# Patient Record
Sex: FEMALE | Race: WHITE | NOT HISPANIC OR LATINO | Employment: FULL TIME | URBAN - METROPOLITAN AREA
[De-identification: names, ages, dates, MRNs, and addresses within clinical notes are randomized per-mention and may not be internally consistent; named-entity substitution may affect disease eponyms.]

---

## 2023-05-12 ENCOUNTER — ANNUAL EXAM (OUTPATIENT)
Dept: OBGYN CLINIC | Facility: CLINIC | Age: 53
End: 2023-05-12

## 2023-05-12 VITALS
HEIGHT: 65 IN | WEIGHT: 256.6 LBS | DIASTOLIC BLOOD PRESSURE: 82 MMHG | BODY MASS INDEX: 42.75 KG/M2 | SYSTOLIC BLOOD PRESSURE: 120 MMHG

## 2023-05-12 DIAGNOSIS — Z12.31 ENCOUNTER FOR SCREENING MAMMOGRAM FOR MALIGNANT NEOPLASM OF BREAST: ICD-10-CM

## 2023-05-12 DIAGNOSIS — Z01.419 ENCNTR FOR GYN EXAM (GENERAL) (ROUTINE) W/O ABN FINDINGS: ICD-10-CM

## 2023-05-12 NOTE — PROGRESS NOTES
Candelaria Rod   1970    CC:  Yearly exam - last annual exam 8/2020    S:  45 y o  female here for yearly exam  She is postmenopausal (since 2021) and has had no vaginal bleeding  She denies vaginal discharge, itching, odor or dryness  Significant improvement in her hot flashes  Does have generalized joint aches and pains  Sexual activity: She is sexually active without pain, bleeding  Last Pap: 6/13/18 - NILM, Neg HPV  Last Mammo: 7/13/22 - BIrad 1  Last Colonoscopy: 6/25/21 - 10yr recall     We reviewed [facility] guidelines for Pap testing  Family hx of breast cancer: M Aunt  Family hx of ovarian cancer: no  Family hx of colon cancer: cousin, P Aunt      Current Outpatient Medications:   •  albuterol (PROVENTIL HFA,VENTOLIN HFA) 90 mcg/act inhaler, Inhale 2 puffs every 6 (six) hours as needed for wheezing, Disp: , Rfl:   •  Biotin 1 MG CAPS, Take by mouth, Disp: , Rfl:   •  levocetirizine (XYZAL) 5 MG tablet, Take 5 mg by mouth daily  , Disp: , Rfl:   •  meclizine (ANTIVERT) 12 5 MG tablet, TAKE 1 2 TABLETS BY MOUTH 1  3 TIMES PER DAY AS NEEDED IF NOT DROWSY, Disp: , Rfl:   •  montelukast (SINGULAIR) 10 mg tablet, Take 10 mg by mouth daily at bedtime  , Disp: , Rfl:   •  multivitamin (THERAGRAN) TABS, Take 1 tablet by mouth daily, Disp: , Rfl:   •  omeprazole (PriLOSEC) 20 mg delayed release capsule, , Disp: , Rfl:   Patient Active Problem List   Diagnosis   • Encounter for screening mammogram for malignant neoplasm of breast   • Screening for human papillomavirus (HPV)   • Encounter for gynecological examination without abnormal finding   • Encounter for gynecological examination (general) (routine) without abnormal findings   • Screening for malignant neoplasm of breast   • Family history of malignant neoplasm of colon   • Bleeding hemorrhoids     Family History   Problem Relation Age of Onset   • Cervical cancer Mother 32   • Diabetes Mother         MELLITUS   • Hypertension Mother    • No "Known Problems Maternal Grandmother    • Alzheimer's disease Maternal Grandfather    • Diabetes Paternal Grandmother         MELLITUS   • Hypertension Father    • No Known Problems Paternal Grandfather    • Breast cancer Maternal Aunt 52   • Colon cancer Paternal Aunt 79   • Colon cancer Cousin 40   • No Known Problems Maternal Aunt    • No Known Problems Maternal Aunt    • No Known Problems Paternal Aunt      Past Medical History:   Diagnosis Date   • Asthma     exercise, allergy induced   • Fibroid     Had it removed  • Kidney stone    • Other specified health status     HX OF MIGRAINE AS PER ALLSCRIPTS   • PONV (postoperative nausea and vomiting)    • Rotator cuff tear     right   • Varicella         Review of Systems   Respiratory: Negative  Cardiovascular: Negative  Gastrointestinal: Negative for constipation and diarrhea  Genitourinary: Negative for difficulty urinating, pelvic pain, vaginal bleeding, vaginal discharge, itching or odor  O:  Blood pressure 120/82, height 5' 5 16\" (1 655 m), weight 116 kg (256 lb 9 6 oz), last menstrual period 02/17/2021, not currently breastfeeding  Patient appears well and is not in distress  Breasts are symmetrical without mass, tenderness, nipple discharge, skin changes or adenopathy  Abdomen is soft and nontender without masses  External genitals are normal without lesions or rashes  Urethral meatus and urethra are normal  Bladder is normal to palpation  Vagina is normal without discharge or bleeding, generalized atrophic changes present   Cervix is normal without discharge or lesion  Uterus is normal, mobile, nontender without palpable mass  Adnexa are normal, nontender, without palpable mass  A:  Yearly exam      P:   Pap & HPV today  Mammo ordered   Colon Cancer Screening up to date      Reviewed considerations of menopause, to call with any postmenopausal bleeding or other concerns  RTO one year for yearly exam or sooner as needed        "

## 2023-05-16 LAB
HPV HR 12 DNA CVX QL NAA+PROBE: NEGATIVE
HPV16 DNA CVX QL NAA+PROBE: NEGATIVE
HPV18 DNA CVX QL NAA+PROBE: NEGATIVE

## 2023-05-18 LAB
LAB AP GYN PRIMARY INTERPRETATION: NORMAL
Lab: NORMAL

## 2024-02-05 ENCOUNTER — HOSPITAL ENCOUNTER (OUTPATIENT)
Dept: RADIOLOGY | Age: 54
Discharge: HOME | End: 2024-02-05
Payer: COMMERCIAL

## 2024-02-05 VITALS — BODY MASS INDEX: 42.65 KG/M2 | HEIGHT: 65 IN | WEIGHT: 256 LBS

## 2024-02-05 DIAGNOSIS — Z12.31 ENCOUNTER FOR SCREENING MAMMOGRAM FOR MALIGNANT NEOPLASM OF BREAST: ICD-10-CM

## 2024-02-05 PROCEDURE — 77063 BREAST TOMOSYNTHESIS BI: CPT

## 2024-02-05 PROCEDURE — 77067 SCR MAMMO BI INCL CAD: CPT

## 2024-02-21 PROBLEM — Z01.419 ENCOUNTER FOR GYNECOLOGICAL EXAMINATION WITHOUT ABNORMAL FINDING: Status: RESOLVED | Noted: 2018-06-13 | Resolved: 2024-02-21

## 2024-02-21 PROBLEM — Z01.419 ENCOUNTER FOR GYNECOLOGICAL EXAMINATION (GENERAL) (ROUTINE) WITHOUT ABNORMAL FINDINGS: Status: RESOLVED | Noted: 2019-08-06 | Resolved: 2024-02-21

## 2024-02-21 PROBLEM — Z11.51 SCREENING FOR HUMAN PAPILLOMAVIRUS (HPV): Status: RESOLVED | Noted: 2018-06-13 | Resolved: 2024-02-21

## 2024-02-21 PROBLEM — Z12.39 SCREENING FOR MALIGNANT NEOPLASM OF BREAST: Status: RESOLVED | Noted: 2019-08-06 | Resolved: 2024-02-21

## 2024-11-22 ENCOUNTER — ANNUAL EXAM (OUTPATIENT)
Dept: OBGYN CLINIC | Facility: CLINIC | Age: 54
End: 2024-11-22
Payer: COMMERCIAL

## 2024-11-22 VITALS
WEIGHT: 252.2 LBS | HEIGHT: 65 IN | DIASTOLIC BLOOD PRESSURE: 90 MMHG | SYSTOLIC BLOOD PRESSURE: 140 MMHG | BODY MASS INDEX: 42.02 KG/M2

## 2024-11-22 DIAGNOSIS — Z01.419 WELL WOMAN EXAM WITH ROUTINE GYNECOLOGICAL EXAM: Primary | ICD-10-CM

## 2024-11-22 PROCEDURE — S0612 ANNUAL GYNECOLOGICAL EXAMINA: HCPCS | Performed by: PHYSICIAN ASSISTANT

## 2024-11-22 RX ORDER — SEMAGLUTIDE 0.25 MG/.5ML
INJECTION, SOLUTION SUBCUTANEOUS
COMMUNITY
Start: 2024-11-18

## 2024-11-22 NOTE — PROGRESS NOTES
"Assessment   54 y.o.  presenting for annual exam.     Plan   Diagnoses and all orders for this visit:    Well woman exam with routine gynecological exam    Other orders  -     Wegovy 0.25 MG/0.5ML        Pap up to date  Mammo scheduled   Colonoscopy up to date   Pelvic pain- reports occasional \"twinges\" of pain. Pt unable to quantify how long and how often pain occurs. No pain presently. Struggles with constipation- unsure if pain is related to this. Advised to track if constipation is an identifiable cause. If no identifiable trigger can consider TVUS    Perineal hygiene reviewed. Weight bearing exercises minium of 150 mins/weekly advised. Kegel exercises recommended.   SBE encouraged, A yearly mammogram is recommended for breast cancer screening starting at age 40. ASCCP guidelines reviewed. Calcium/ Vit D dietary requirements discussed.   Advised to call with any issues, all concerns & questions addressed.   See provided information in your after visit summary     F/U Annually and PRN    Results will be released to [email or web address], if abnormal will call or message to review and discuss treatment plan.     __________________________________________________________________    Subjective     Obduliabatsheva Gaston is a 54 y.o.  presenting for annual exam.    She reports an occasional twinge of pain in the lower pelvis from time to time.  She is unable to quantify how long this has been going on or how often it occurs.  Believes this only occurs about once a month.  Pain is brief and fleeting and only mild.  She does struggle with chronic constipation.  She has not tracked pain to see if related to this.    SCREENING  Last Pap: 2023 neg/neg  Last Mammo: 2024 birads 1  Last Colonoscopy: 2021 10 year recall      GYN  , no VB     Sexually active: Yes - single partner - male    Hx Abnormal pap: denies  We reviewed ASCCP guidelines for Pap testing today.    Denies vaginal discharge, itching, odor, " dyspareunia, pelvic pain and vulvar/vaginal symptoms      OB           Complaints: denies   Denies urgency, frequency, hematuria, leakage / change in stream, difficulty urinating.       BREAST  Complaints: denies   Denies: breast lump, breast tenderness, nipple discharge, skin color change, and skin lesion(s)  Personal hx: none      Pertinent Family Hx:   Family hx of breast cancer: maternal aunt   Family hx of ovarian cancer: no  Family hx of colon cancer: paternal aunt, paternal cousin  Family hx of cervical cancer: mom      GENERAL  PMH reviewed/updated and is as below.   Patient does follow with a PCP.    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    Kidney stone     Other specified health status     HX OF MIGRAINE AS PER ALLSCRIPTS    PONV (postoperative nausea and vomiting)     Rotator cuff tear     right    Varicella        Past Surgical History:   Procedure Laterality Date     SECTION      x2    MYOMECTOMY      UTERINE    [place] SURGICAL ARTHROSCOPY SHOULDER LMTD DBRDMT  Right 2017    Procedure: ARTHROSCOPY SHOULDER ROTATOR CUFF DEBRIDEMENT, LYSIS OF ADHESIONS SUBACROMIAL SPACE;  Surgeon: Feliciano Wang MD;  [facility];  Service: Orthopedics    [place] SURGICAL ARTHROSCOPY SHOULDER W/ROTATOR CUFF RPR Right 2016    Procedure: SHOULDER ARTHROSCOPIC ROTATOR CUFF REPAIR ;  Surgeon: Feliciano Wang MD;  [facility];  Service: Orthopedics    TOOTH EXTRACTION           Current Outpatient Medications:     albuterol (PROVENTIL HFA,VENTOLIN HFA) 90 mcg/act inhaler, Inhale 2 puffs every 6 (six) hours as needed for wheezing, Disp: , Rfl:     Biotin 1 MG CAPS, Take by mouth, Disp: , Rfl:     levocetirizine (XYZAL) 5 MG tablet, Take 5 mg by mouth daily., Disp: , Rfl:     meclizine (ANTIVERT) 12.5 MG tablet,  TAKE 1 2 TABLETS BY MOUTH 1  3 TIMES PER DAY AS NEEDED IF NOT DROWSY, Disp: , Rfl:     montelukast (SINGULAIR) 10 mg tablet, Take 10 mg by mouth daily at bedtime., Disp: , Rfl:     multivitamin (THERAGRAN) TABS, Take 1 tablet by mouth daily, Disp: , Rfl:     omeprazole (PriLOSEC) 20 mg delayed release capsule, , Disp: , Rfl:     Wegovy 0.25 MG/0.5ML, , Disp: , Rfl:     No Known Allergies    Social History     Socioeconomic History    Marital status: /Civil Union     Spouse name: Not on file    Number of children: Not on file    Years of education: Not on file    Highest education level: Not on file   Occupational History    Not on file   Tobacco Use    Smoking status: Never    Smokeless tobacco: Never   Vaping Use    Vaping status: Never Used   Substance and Sexual Activity    Alcohol use: Yes     Comment: Only every once in awhile.    Drug use: Never    Sexual activity: Yes     Partners: Male     Birth control/protection: None   Other Topics Concern    Not on file   Social History Narrative    CAFFEINE USE    DAILY COFFEE CONSUMPTION (1 CUPS/DAY)     Social Drivers of Health     Financial Resource Strain: Not on file   Food Insecurity: Not on file   Transportation Needs: Not on file   Physical Activity: Not on file   Stress: Not on file   Social Connections: Unknown (6/18/2024)    Received from [facility]     How often do you feel lonely or isolated from those around you? (Adult - for ages 18 years and over): Not on file   Intimate Partner Violence: Not on file   Housing Stability: Not on file       Review of Systems     ROS:  Constitutional: Negative for fatigue and unexpected weight change.   Respiratory: Negative for cough and shortness of breath.    Cardiovascular: Negative for chest pain and palpitations.   Gastrointestinal: Negative for abdominal pain and change in bowel habits  Breasts:  Negative, other than as noted above.   Genitourinary: Negative, other than as noted above.  "  Psychiatric: Negative for mood difficulties.      Objective      /90 (BP Location: Left arm, Patient Position: Sitting, Cuff Size: Large)   Ht 5' 5\" (1.651 m)   Wt 114 kg (252 lb 3.2 oz)   LMP 02/17/2021   BMI 41.97 kg/m²     Physical Examination:    Patient appears well and is not in distress  Neck is supple without masses, no cervical or supraclavicular lymphadenopathy  Cardiovascular: regular rate and rhythm; no murmurs  Lungs: clear to auscultation bilaterally; no wheezes  Breasts are symmetrical without mass, tenderness, nipple discharge, skin changes or adenopathy.   Abdomen is soft and nontender without masses.   External genitals are normal without lesions or rashes.  Urethral meatus and urethra are normal  Bladder is normal to palpation  Vagina is normal without discharge or bleeding.   Cervix is normal without discharge or lesion.   Uterus is normal, mobile, nontender without palpable mass.  Adnexa are normal, nontender, without palpable mass.                 "

## 2025-02-18 ENCOUNTER — HOSPITAL ENCOUNTER (OUTPATIENT)
Dept: RADIOLOGY | Age: 55
Discharge: HOME | End: 2025-02-18
Payer: COMMERCIAL

## 2025-02-18 VITALS — HEIGHT: 65 IN | BODY MASS INDEX: 41.65 KG/M2 | WEIGHT: 250 LBS

## 2025-02-18 DIAGNOSIS — Z12.31 ENCOUNTER FOR SCREENING MAMMOGRAM FOR MALIGNANT NEOPLASM OF BREAST: ICD-10-CM

## 2025-02-18 PROCEDURE — 77063 BREAST TOMOSYNTHESIS BI: CPT

## 2025-02-18 PROCEDURE — 77067 SCR MAMMO BI INCL CAD: CPT

## 2025-02-24 ENCOUNTER — HOSPITAL ENCOUNTER (EMERGENCY)
Facility: HOSPITAL | Age: 55
Discharge: HOME | End: 2025-02-25
Attending: EMERGENCY MEDICINE | Admitting: EMERGENCY MEDICINE
Payer: COMMERCIAL

## 2025-02-24 VITALS
HEART RATE: 96 BPM | SYSTOLIC BLOOD PRESSURE: 196 MMHG | RESPIRATION RATE: 18 BRPM | OXYGEN SATURATION: 95 % | TEMPERATURE: 98.1 F | DIASTOLIC BLOOD PRESSURE: 101 MMHG

## 2025-02-24 DIAGNOSIS — N39.0 UTI (URINARY TRACT INFECTION): Primary | ICD-10-CM

## 2025-02-24 PROCEDURE — 99283 EMERGENCY DEPT VISIT LOW MDM: CPT

## 2025-02-24 PROCEDURE — 87077 CULTURE AEROBIC IDENTIFY: CPT

## 2025-02-24 PROCEDURE — 87086 URINE CULTURE/COLONY COUNT: CPT

## 2025-02-24 PROCEDURE — 87186 SC STD MICRODIL/AGAR DIL: CPT

## 2025-02-24 PROCEDURE — 81001 URINALYSIS AUTO W/SCOPE: CPT

## 2025-02-25 LAB
BACTERIA UR QL AUTO: ABNORMAL /HPF
BILIRUB UR QL STRIP: NEGATIVE
CLARITY UR: ABNORMAL
COLOR UR: ABNORMAL
GLUCOSE UR STRIP-MCNC: ABNORMAL MG/DL
HGB UR QL STRIP.AUTO: ABNORMAL
KETONES UR STRIP-MCNC: NEGATIVE MG/DL
LEUKOCYTE ESTERASE UR QL STRIP: ABNORMAL
NITRITE UR QL STRIP: NEGATIVE
NON-SQ EPI CELLS URNS QL MICRO: ABNORMAL /HPF
PH UR STRIP.AUTO: 6.5 [PH]
PROT UR STRIP-MCNC: ABNORMAL MG/DL
RBC #/AREA URNS AUTO: ABNORMAL /HPF
SP GR UR STRIP.AUTO: 1.03 (ref 1–1.03)
UROBILINOGEN UR STRIP-ACNC: <2 MG/DL
WBC #/AREA URNS AUTO: ABNORMAL /HPF

## 2025-02-25 PROCEDURE — 99284 EMERGENCY DEPT VISIT MOD MDM: CPT | Performed by: EMERGENCY MEDICINE

## 2025-02-25 RX ORDER — CEPHALEXIN 500 MG/1
500 CAPSULE ORAL EVERY 6 HOURS SCHEDULED
Qty: 28 CAPSULE | Refills: 0 | Status: SHIPPED | OUTPATIENT
Start: 2025-02-25 | End: 2025-03-04

## 2025-02-25 NOTE — DISCHARGE INSTRUCTIONS
You are seen in the emergency department for urinary symptoms.  You likely have a urinary tract infection.  Prescription for an antibiotic to be taken for 4 times a day for 7 days has been sent to your pharmacy.  Please take it for the full duration.  Its important to follow-up with your OB/GYN to discuss potential vaginal bleeding.  Please follow-up with your primary care doctor.  Return to the emergency department if you develop abdominal pain, back pain, fever, or any new or concerning symptoms.

## 2025-02-25 NOTE — ED ATTENDING ATTESTATION
2/24/2025  I, Serina Lyon MD, saw and evaluated the patient. I have discussed the patient with the resident/non-physician practitioner and agree with the resident's/non-physician practitioner's findings, Plan of Care, and MDM as documented in the resident's/non-physician practitioner's note, except where noted. All available labs and Radiology studies were reviewed.  I was present for key portions of any procedure(s) performed by the resident/non-physician practitioner and I was immediately available to provide assistance.       At this point I agree with the current assessment done in the Emergency Department.  I have conducted an independent evaluation of this patient a history and physical is as follows:    ED Course         Critical Care Time  Procedures    53 yo female with hx of kidney stone, here for dysuria, hematuria, increased frequency started today. Pt noted clots in urine.  No blood thinners.  No fever, no abdominal pain, no back pain. Pt with hx of uti.  Vss, afebrile, lungs cta, rrr, abdomen soft nontender.  No cva tenderness. Urine.

## 2025-02-26 ENCOUNTER — RESULTS FOLLOW-UP (OUTPATIENT)
Dept: EMERGENCY DEPT | Facility: HOSPITAL | Age: 55
End: 2025-02-26

## 2025-02-27 LAB — BACTERIA UR CULT: ABNORMAL

## 2025-02-28 NOTE — ED PROVIDER NOTES
"Time reflects when diagnosis was documented in both MDM as applicable and the Disposition within this note       Time User Action Codes Description Comment    2/25/2025 12:41 AM Wilder, Sara Add [N39.0] UTI (urinary tract infection)           ED Disposition       ED Disposition   Discharge    Condition   Stable    Date/Time   Tue Feb 25, 2025 12:41 AM    Comment   Obdulia Gaston discharge to home/self care.                   Assessment & Plan       Medical Decision Making  Patient is a 54 y.o. female with PMH of kidney stone who presents to the ED with dysuria and hematuria.    Vital signs hypertensive on arrival but otherwise unremarkable. On exam no belly tenderness or cva tenderness.    History and physical exam most consistent with UTI. However, differential diagnosis included but not limited to vaginal bleeding, kidney stones     Plan: UA    View ED course above for further discussion on patient workup.         All labs reviewed and utilized in the medical decision making process  All radiology studies independently viewed by me and interpreted by the radiologist.  I reviewed all testing with the patient.     UA consistent with infection. Will prescribe outpatient antibiotics. Encouraged PCP and obgyn follow up. Discussed return precautions. Patient agrees and understands plan. All questions answered.      Disposition: stable discahrge    Portions of the record may have been created with voice recognition software. Occasional wrong word or \"sound a like\" substitutions may have occurred due to the inherent limitations of voice recognition software. Read the chart carefully and recognize, using context, where substitutions have occurred.      Amount and/or Complexity of Data Reviewed  Labs: ordered.    Risk  Prescription drug management.             Medications - No data to display    ED Risk Strat Scores                                                History of Present Illness       Chief Complaint "   Patient presents with    Possible UTI     Pt started w/ increased urinary frequency & burning w/ urination tonight & states she's having bloody urine w/ clots. Unsure if blood is coming from urethra or vagina        Past Medical History:   Diagnosis Date    Asthma     exercise, allergy induced    Fibroid     Had it removed.    Kidney stone     Other specified health status     HX OF MIGRAINE AS PER ALLSCRIPTS    PONV (postoperative nausea and vomiting)     Rotator cuff tear     right    Varicella       Past Surgical History:   Procedure Laterality Date     SECTION      x2    MYOMECTOMY      UTERINE    [place] SURGICAL ARTHROSCOPY SHOULDER LMTD DBRDMT / Right 2017    Procedure: ARTHROSCOPY SHOULDER ROTATOR CUFF DEBRIDEMENT, LYSIS OF ADHESIONS SUBACROMIAL SPACE;  Surgeon: Feliciano Wang MD;  [facility];  Service: Orthopedics    [place] SURGICAL ARTHROSCOPY SHOULDER W/ROTATOR CUFF RPR Right 2016    Procedure: SHOULDER ARTHROSCOPIC ROTATOR CUFF REPAIR ;  Surgeon: Feliciano Wang MD;  [facility];  Service: Orthopedics    TOOTH EXTRACTION        Family History   Problem Relation Age of Onset    Cervical cancer Mother 31    Diabetes Mother         MELLITUS    Hypertension Mother     Hypertension Father     No Known Problems Maternal Grandmother     Alzheimer's disease Maternal Grandfather     Diabetes Paternal Grandmother         MELLITUS    No Known Problems Paternal Grandfather     Breast cancer Maternal Aunt 49    No Known Problems Maternal Aunt     No Known Problems Maternal Aunt     Colon cancer Paternal Aunt 67    No Known Problems Paternal Aunt     Colon cancer Cousin 44    Ovarian cancer Neg Hx     Uterine cancer Neg Hx       Social History     Tobacco Use    Smoking status: Never    Smokeless tobacco: Never   Vaping Use    Vaping status: Never Used   Substance Use Topics    Alcohol use: Yes     Comment: Only every once in awhile.    Drug use: Never      E-Cigarette/Vaping    E-Cigarette  Use Never User       E-Cigarette/Vaping Substances    Nicotine No     THC No     CBD No     Flavoring No     Other No     Unknown No       I have reviewed and agree with the history as documented.     54F with PMH of prior kidney stones presents for dysuria and hematuria that started about 8 hours ago. Patient noted blood clots in her urine as well. This does not feel like her prior kidney stone. She feels urgency and suprapubic fullness. Denies fever, back pain, abdominal pain, vaginal discharge, diarrhea          Review of Systems   Constitutional:  Negative for chills and fever.   HENT:  Negative for ear pain and sore throat.    Eyes:  Negative for pain and visual disturbance.   Respiratory:  Negative for cough and shortness of breath.    Cardiovascular:  Negative for chest pain and palpitations.   Gastrointestinal:  Negative for abdominal pain, diarrhea, nausea and vomiting.   Genitourinary:  Positive for dysuria, hematuria and urgency. Negative for flank pain.   Musculoskeletal:  Negative for arthralgias and back pain.   Skin:  Negative for color change and rash.   Neurological:  Negative for seizures and syncope.   All other systems reviewed and are negative.          Objective       ED Triage Vitals   Temperature Pulse Blood Pressure Respirations SpO2 Patient Position - Orthostatic VS   02/24/25 2306 02/24/25 2306 02/24/25 2307 02/24/25 2306 02/24/25 2306 02/24/25 2307   98.1 °F (36.7 °C) 96 (!) 196/101 18 95 % Lying      Temp Source Heart Rate Source BP Location FiO2 (%) Pain Score    02/24/25 2306 02/24/25 2306 02/24/25 2307 -- --    Temporal Monitor Right arm        Vitals      Date and Time Temp Pulse SpO2 Resp BP Pain Score FACES Pain Rating User   02/24/25 2307 -- -- -- -- 196/101 -- -- PT   02/24/25 2306 98.1 °F (36.7 °C) 96 95 % 18 -- -- -- PT            Physical Exam  Vitals and nursing note reviewed.   Constitutional:       General: She is not in acute distress.     Appearance: She is  well-developed.   HENT:      Head: Normocephalic and atraumatic.   Eyes:      Conjunctiva/sclera: Conjunctivae normal.   Cardiovascular:      Rate and Rhythm: Normal rate and regular rhythm.      Heart sounds: No murmur heard.  Pulmonary:      Effort: Pulmonary effort is normal. No respiratory distress.      Breath sounds: Normal breath sounds.   Abdominal:      Palpations: Abdomen is soft.      Tenderness: There is no abdominal tenderness. There is no right CVA tenderness, left CVA tenderness, guarding or rebound.   Musculoskeletal:         General: No swelling.      Cervical back: Neck supple.   Skin:     General: Skin is warm and dry.      Capillary Refill: Capillary refill takes less than 2 seconds.   Neurological:      Mental Status: She is alert.   Psychiatric:         Mood and Affect: Mood normal.         Results Reviewed       Procedure Component Value Units Date/Time    Urine culture [[id number]]  (Abnormal)  (Susceptibility) Collected: 02/24/25 2359    Lab Status: Final result Specimen: Urine, Clean Catch Updated: 02/27/25 0713     Urine Culture >100,000 cfu/ml Escherichia coli    Susceptibility       Escherichia coli (1)       Antibiotic Interpretation Microscan   Method Status    ZID Performed  Yes  PASTORA Final    Ampicillin ($$) Susceptible <=8.00 ug/ml PASTORA Final    Aztreonam ($$$)  Susceptible <=4 ug/ml PASTORA Final    Cefazolin ($) Susceptible <=2.00 ug/ml PASTORA Final    Ciprofloxacin ($)  Susceptible <=0.25 ug/ml PASTORA Final    Gentamicin ($$) Susceptible <=2 ug/ml PASTORA Final    Levofloxacin ($) Susceptible <=0.50 ug/ml PASTORA Final    Nitrofurantoin Susceptible <=32 ug/ml PASTORA Final    Tetracycline Susceptible <=4 ug/ml PASTORA Final    Trimethoprim + Sulfamethoxazole ($$$) Susceptible <=0.5/9.5 ug/ml PASTORA Final                       Urine Microscopic [[id number]]  (Abnormal) Collected: 02/24/25 2359    Lab Status: Final result Specimen: Urine, Clean Catch Updated: 02/25/25 0029     RBC, UA Innumerable /hpf      WBC, UA  Innumerable /hpf      Epithelial Cells None Seen /hpf      Bacteria, UA None Seen /hpf     UA w Reflex to Microscopic w Reflex to Culture [[id number]]  (Abnormal) Collected: 02/24/25 6207    Lab Status: Final result Specimen: Urine, Clean Catch Updated: 02/25/25 0019     Color, UA Dark Red     Clarity, UA Extra Turbid     Specific Gravity, UA 1.029     pH, UA 6.5     Leukocytes, UA Moderate     Nitrite, UA Negative     Protein,  (3+) mg/dl      Glucose, UA Trace mg/dl      Ketones, UA Negative mg/dl      Urobilinogen, UA <2.0 mg/dl      Bilirubin, UA Negative     Occult Blood, UA Large            No orders to display       Procedures    ED Medication and Procedure Management   Prior to Admission Medications   Prescriptions Last Dose Informant Patient Reported? Taking?   Biotin 1 MG CAPS  Self Yes No   Sig: Take by mouth   Wegovy 0.25 MG/0.5ML   Yes No   albuterol (PROVENTIL HFA,VENTOLIN HFA) 90 mcg/act inhaler  Self Yes No   Sig: Inhale 2 puffs every 6 (six) hours as needed for wheezing   levocetirizine (XYZAL) 5 MG tablet  Self Yes No   Sig: Take 5 mg by mouth daily.   meclizine (ANTIVERT) 12.5 MG tablet  Self Yes No   Sig: TAKE 1 2 TABLETS BY MOUTH 1  3 TIMES PER DAY AS NEEDED IF NOT DROWSY   montelukast (SINGULAIR) 10 mg tablet  Self Yes No   Sig: Take 10 mg by mouth daily at bedtime.   multivitamin (THERAGRAN) TABS  Self Yes No   Sig: Take 1 tablet by mouth daily   omeprazole (PriLOSEC) 20 mg delayed release capsule   Yes No      Facility-Administered Medications: None     Discharge Medication List as of 2/25/2025 12:44 AM        START taking these medications    Details   cephalexin (KEFLEX) 500 mg capsule Take 1 capsule (500 mg total) by mouth every 6 (six) hours for 7 days, Starting Tue 2/25/2025, Until Tue 3/4/2025, Normal           CONTINUE these medications which have NOT CHANGED    Details   albuterol (PROVENTIL HFA,VENTOLIN HFA) 90 mcg/act inhaler Inhale 2 puffs every 6 (six) hours as needed for  wheezing, Historical Med      Biotin 1 MG CAPS Take by mouth, Historical Med      levocetirizine (XYZAL) 5 MG tablet Take 5 mg by mouth daily., Historical Med      meclizine (ANTIVERT) 12.5 MG tablet TAKE 1 2 TABLETS BY MOUTH 1  3 TIMES PER DAY AS NEEDED IF NOT DROWSY, Historical Med      montelukast (SINGULAIR) 10 mg tablet Take 10 mg by mouth daily at bedtime., Historical Med      multivitamin (THERAGRAN) TABS Take 1 tablet by mouth daily, Historical Med      omeprazole (PriLOSEC) 20 mg delayed release capsule Starting Wed 9/7/2022, Historical Med      Wegovy 0.25 MG/0.5ML Historical Med           No discharge procedures on file.  ED SEPSIS DOCUMENTATION   Time reflects when diagnosis was documented in both MDM as applicable and the Disposition within this note       Time User Action Codes Description Comment    2/25/2025 12:41 AM Shelly Wilder Add [N39.0] UTI (urinary tract infection)                  Shelly Wilder DO  02/27/25 2047

## 2025-04-05 ENCOUNTER — APPOINTMENT (OUTPATIENT)
Dept: LAB | Facility: CLINIC | Age: 55
End: 2025-04-05
Payer: COMMERCIAL

## 2025-04-05 DIAGNOSIS — R03.0 ELEVATED BLOOD PRESSURE READING WITHOUT DIAGNOSIS OF HYPERTENSION: ICD-10-CM

## 2025-04-05 DIAGNOSIS — R20.2 PARESTHESIA: ICD-10-CM

## 2025-04-05 DIAGNOSIS — M79.10 MYALGIA: ICD-10-CM

## 2025-04-05 DIAGNOSIS — R76.8 FALSE POSITIVE SEROLOGICAL TEST FOR SYPHILIS: ICD-10-CM

## 2025-04-05 DIAGNOSIS — M79.675 PAIN IN TOE OF LEFT FOOT: ICD-10-CM

## 2025-04-05 LAB
25(OH)D3 SERPL-MCNC: 38.3 NG/ML (ref 30–100)
ALBUMIN SERPL BCG-MCNC: 4.1 G/DL (ref 3.5–5)
ALP SERPL-CCNC: 112 U/L (ref 34–104)
ALT SERPL W P-5'-P-CCNC: 22 U/L (ref 7–52)
ANION GAP SERPL CALCULATED.3IONS-SCNC: 10 MMOL/L (ref 4–13)
AST SERPL W P-5'-P-CCNC: 19 U/L (ref 13–39)
BACTERIA UR QL AUTO: ABNORMAL /HPF
BASOPHILS # BLD AUTO: 0.03 THOUSANDS/ÂΜL (ref 0–0.1)
BASOPHILS NFR BLD AUTO: 0 % (ref 0–1)
BILIRUB SERPL-MCNC: 0.38 MG/DL (ref 0.2–1)
BILIRUB UR QL STRIP: NEGATIVE
BUN SERPL-MCNC: 18 MG/DL (ref 5–25)
CALCIUM SERPL-MCNC: 9.1 MG/DL (ref 8.4–10.2)
CHLORIDE SERPL-SCNC: 103 MMOL/L (ref 96–108)
CHOLEST SERPL-MCNC: 219 MG/DL (ref ?–200)
CLARITY UR: CLEAR
CO2 SERPL-SCNC: 27 MMOL/L (ref 21–32)
COLOR UR: ABNORMAL
CREAT SERPL-MCNC: 0.86 MG/DL (ref 0.6–1.3)
EOSINOPHIL # BLD AUTO: 0.23 THOUSAND/ÂΜL (ref 0–0.61)
EOSINOPHIL NFR BLD AUTO: 3 % (ref 0–6)
ERYTHROCYTE [DISTWIDTH] IN BLOOD BY AUTOMATED COUNT: 12.9 % (ref 11.6–15.1)
EST. AVERAGE GLUCOSE BLD GHB EST-MCNC: 117 MG/DL
GFR SERPL CREATININE-BSD FRML MDRD: 75 ML/MIN/1.73SQ M
GLUCOSE P FAST SERPL-MCNC: 101 MG/DL (ref 65–99)
GLUCOSE UR STRIP-MCNC: NEGATIVE MG/DL
HBA1C MFR BLD: 5.7 %
HCT VFR BLD AUTO: 41.1 % (ref 34.8–46.1)
HDLC SERPL-MCNC: 56 MG/DL
HGB BLD-MCNC: 13.3 G/DL (ref 11.5–15.4)
HGB UR QL STRIP.AUTO: NEGATIVE
IMM GRANULOCYTES # BLD AUTO: 0.01 THOUSAND/UL (ref 0–0.2)
IMM GRANULOCYTES NFR BLD AUTO: 0 % (ref 0–2)
KETONES UR STRIP-MCNC: NEGATIVE MG/DL
LDLC SERPL CALC-MCNC: 137 MG/DL (ref 0–100)
LEUKOCYTE ESTERASE UR QL STRIP: ABNORMAL
LYMPHOCYTES # BLD AUTO: 2.65 THOUSANDS/ÂΜL (ref 0.6–4.47)
LYMPHOCYTES NFR BLD AUTO: 39 % (ref 14–44)
MCH RBC QN AUTO: 27 PG (ref 26.8–34.3)
MCHC RBC AUTO-ENTMCNC: 32.4 G/DL (ref 31.4–37.4)
MCV RBC AUTO: 84 FL (ref 82–98)
MONOCYTES # BLD AUTO: 0.53 THOUSAND/ÂΜL (ref 0.17–1.22)
MONOCYTES NFR BLD AUTO: 8 % (ref 4–12)
NEUTROPHILS # BLD AUTO: 3.39 THOUSANDS/ÂΜL (ref 1.85–7.62)
NEUTS SEG NFR BLD AUTO: 50 % (ref 43–75)
NITRITE UR QL STRIP: NEGATIVE
NON-SQ EPI CELLS URNS QL MICRO: ABNORMAL /HPF
NRBC BLD AUTO-RTO: 0 /100 WBCS
PH UR STRIP.AUTO: 7 [PH]
PLATELET # BLD AUTO: 298 THOUSANDS/UL (ref 149–390)
PMV BLD AUTO: 10.6 FL (ref 8.9–12.7)
POTASSIUM SERPL-SCNC: 4 MMOL/L (ref 3.5–5.3)
PROT SERPL-MCNC: 6.9 G/DL (ref 6.4–8.4)
PROT UR STRIP-MCNC: NEGATIVE MG/DL
RBC # BLD AUTO: 4.92 MILLION/UL (ref 3.81–5.12)
RBC #/AREA URNS AUTO: ABNORMAL /HPF
SODIUM SERPL-SCNC: 140 MMOL/L (ref 135–147)
SP GR UR STRIP.AUTO: 1.02 (ref 1–1.03)
TRIGL SERPL-MCNC: 130 MG/DL (ref ?–150)
TSH SERPL DL<=0.05 MIU/L-ACNC: 2.31 UIU/ML (ref 0.45–4.5)
UROBILINOGEN UR STRIP-ACNC: <2 MG/DL
WBC # BLD AUTO: 6.84 THOUSAND/UL (ref 4.31–10.16)
WBC #/AREA URNS AUTO: ABNORMAL /HPF

## 2025-04-05 PROCEDURE — 84443 ASSAY THYROID STIM HORMONE: CPT

## 2025-04-05 PROCEDURE — 86225 DNA ANTIBODY NATIVE: CPT

## 2025-04-05 PROCEDURE — 80053 COMPREHEN METABOLIC PANEL: CPT

## 2025-04-05 PROCEDURE — 86038 ANTINUCLEAR ANTIBODIES: CPT

## 2025-04-05 PROCEDURE — 82306 VITAMIN D 25 HYDROXY: CPT

## 2025-04-05 PROCEDURE — 83036 HEMOGLOBIN GLYCOSYLATED A1C: CPT

## 2025-04-05 PROCEDURE — 36415 COLL VENOUS BLD VENIPUNCTURE: CPT

## 2025-04-05 PROCEDURE — 80061 LIPID PANEL: CPT

## 2025-04-05 PROCEDURE — 81001 URINALYSIS AUTO W/SCOPE: CPT

## 2025-04-05 PROCEDURE — 85025 COMPLETE CBC W/AUTO DIFF WBC: CPT

## 2025-04-07 LAB
DSDNA IGG SERPL IA-ACNC: 9.8 IU/ML (ref ?–15)
NUCLEAR IGG SER IA-RTO: <0.09 RATIO (ref ?–1)

## 2025-04-25 ENCOUNTER — HOSPITAL ENCOUNTER (OUTPATIENT)
Dept: RADIOLOGY | Facility: HOSPITAL | Age: 55
Discharge: HOME | End: 2025-04-25

## 2025-04-25 DIAGNOSIS — R10.2 PELVIC PAIN: ICD-10-CM

## 2025-04-25 DIAGNOSIS — R10.30 LOWER ABDOMINAL PAIN: ICD-10-CM

## 2025-04-27 ENCOUNTER — HOSPITAL ENCOUNTER (OUTPATIENT)
Dept: RADIOLOGY | Facility: HOSPITAL | Age: 55
Discharge: HOME | End: 2025-04-27
Attending: ALLERGY & IMMUNOLOGY
Payer: COMMERCIAL

## 2025-04-27 DIAGNOSIS — N83.201 BILATERAL OVARIAN CYSTS: ICD-10-CM

## 2025-04-27 DIAGNOSIS — R14.0 BLOATING: ICD-10-CM

## 2025-04-27 DIAGNOSIS — R10.30 LOWER ABDOMINAL PAIN: ICD-10-CM

## 2025-04-27 DIAGNOSIS — N83.202 BILATERAL OVARIAN CYSTS: ICD-10-CM

## 2025-04-27 DIAGNOSIS — R30.0 DYSURIA: ICD-10-CM

## 2025-04-27 PROCEDURE — 76775 US EXAM ABDO BACK WALL LIM: CPT

## 2025-04-27 PROCEDURE — 76830 TRANSVAGINAL US NON-OB: CPT

## 2025-04-27 PROCEDURE — 76856 US EXAM PELVIC COMPLETE: CPT
